# Patient Record
Sex: FEMALE | Race: WHITE | Employment: UNEMPLOYED | URBAN - METROPOLITAN AREA
[De-identification: names, ages, dates, MRNs, and addresses within clinical notes are randomized per-mention and may not be internally consistent; named-entity substitution may affect disease eponyms.]

---

## 2018-10-22 LAB
CHLAMYDIA, EXTERNAL: NEGATIVE
HBSAG, EXTERNAL: NEGATIVE
HIV, EXTERNAL: NEGATIVE
N. GONORRHEA, EXTERNAL: NEGATIVE
RPR, EXTERNAL: NORMAL
RUBELLA, EXTERNAL: NORMAL

## 2019-03-18 LAB — GRBS, EXTERNAL: NEGATIVE

## 2019-04-16 ENCOUNTER — HOSPITAL ENCOUNTER (INPATIENT)
Age: 34
LOS: 3 days | Discharge: HOME | End: 2019-04-19
Attending: OBSTETRICS & GYNECOLOGY | Admitting: OBSTETRICS & GYNECOLOGY
Payer: COMMERCIAL

## 2019-04-16 PROBLEM — O34.219 DESIRES VBAC (VAGINAL BIRTH AFTER CESAREAN) TRIAL: Status: ACTIVE | Noted: 2019-04-16

## 2019-04-16 PROBLEM — O09.529 ADVANCED MATERNAL AGE IN MULTIGRAVIDA: Status: ACTIVE | Noted: 2019-04-16

## 2019-04-16 LAB
ABO + RH BLD: NORMAL
BASOPHILS # BLD: 0 K/UL (ref 0–0.1)
BASOPHILS NFR BLD: 0 % (ref 0–2)
BLOOD GROUP ANTIBODIES SERPL: NORMAL
DIFFERENTIAL METHOD BLD: ABNORMAL
EOSINOPHIL # BLD: 0.1 K/UL (ref 0–0.4)
EOSINOPHIL NFR BLD: 1 % (ref 0–5)
ERYTHROCYTE [DISTWIDTH] IN BLOOD BY AUTOMATED COUNT: 13.2 % (ref 11.6–14.5)
HCT VFR BLD AUTO: 35 % (ref 35–45)
HGB BLD-MCNC: 11.7 G/DL (ref 12–16)
LYMPHOCYTES # BLD: 2.5 K/UL (ref 0.9–3.6)
LYMPHOCYTES NFR BLD: 28 % (ref 21–52)
MCH RBC QN AUTO: 32.7 PG (ref 24–34)
MCHC RBC AUTO-ENTMCNC: 33.4 G/DL (ref 31–37)
MCV RBC AUTO: 97.8 FL (ref 74–97)
MONOCYTES # BLD: 0.8 K/UL (ref 0.05–1.2)
MONOCYTES NFR BLD: 8 % (ref 3–10)
NEUTS SEG # BLD: 5.7 K/UL (ref 1.8–8)
NEUTS SEG NFR BLD: 63 % (ref 40–73)
PLATELET # BLD AUTO: 185 K/UL (ref 135–420)
PMV BLD AUTO: 10.5 FL (ref 9.2–11.8)
RBC # BLD AUTO: 3.58 M/UL (ref 4.2–5.3)
SPECIMEN EXP DATE BLD: NORMAL
WBC # BLD AUTO: 9.1 K/UL (ref 4.6–13.2)

## 2019-04-16 PROCEDURE — 74011250636 HC RX REV CODE- 250/636: Performed by: ADVANCED PRACTICE MIDWIFE

## 2019-04-16 PROCEDURE — 86900 BLOOD TYPING SEROLOGIC ABO: CPT

## 2019-04-16 PROCEDURE — 85025 COMPLETE CBC W/AUTO DIFF WBC: CPT

## 2019-04-16 PROCEDURE — 77030028565 HC CATH CERV RIPNG BLN COOK -B

## 2019-04-16 PROCEDURE — 65270000029 HC RM PRIVATE

## 2019-04-16 RX ORDER — MINERAL OIL
30 OIL (ML) ORAL AS NEEDED
Status: DISCONTINUED | OUTPATIENT
Start: 2019-04-16 | End: 2019-04-17 | Stop reason: HOSPADM

## 2019-04-16 RX ORDER — SODIUM CHLORIDE, SODIUM LACTATE, POTASSIUM CHLORIDE, CALCIUM CHLORIDE 600; 310; 30; 20 MG/100ML; MG/100ML; MG/100ML; MG/100ML
125 INJECTION, SOLUTION INTRAVENOUS CONTINUOUS
Status: DISCONTINUED | OUTPATIENT
Start: 2019-04-16 | End: 2019-04-17 | Stop reason: HOSPADM

## 2019-04-16 RX ORDER — METHYLERGONOVINE MALEATE 0.2 MG/ML
0.2 INJECTION INTRAVENOUS AS NEEDED
Status: DISCONTINUED | OUTPATIENT
Start: 2019-04-16 | End: 2019-04-17 | Stop reason: HOSPADM

## 2019-04-16 RX ORDER — ZOLPIDEM TARTRATE 5 MG/1
5 TABLET ORAL
Status: DISCONTINUED | OUTPATIENT
Start: 2019-04-16 | End: 2019-04-17 | Stop reason: SDUPTHER

## 2019-04-16 RX ORDER — NALBUPHINE HYDROCHLORIDE 10 MG/ML
10 INJECTION, SOLUTION INTRAMUSCULAR; INTRAVENOUS; SUBCUTANEOUS
Status: DISCONTINUED | OUTPATIENT
Start: 2019-04-16 | End: 2019-04-17 | Stop reason: HOSPADM

## 2019-04-16 RX ORDER — OXYTOCIN/RINGER'S LACTATE 20/1000 ML
999 PLASTIC BAG, INJECTION (ML) INTRAVENOUS ONCE
Status: COMPLETED | OUTPATIENT
Start: 2019-04-16 | End: 2019-04-17

## 2019-04-16 RX ORDER — HYDROMORPHONE HYDROCHLORIDE 1 MG/ML
1 INJECTION, SOLUTION INTRAMUSCULAR; INTRAVENOUS; SUBCUTANEOUS
Status: DISCONTINUED | OUTPATIENT
Start: 2019-04-16 | End: 2019-04-17 | Stop reason: HOSPADM

## 2019-04-16 RX ORDER — BUTORPHANOL TARTRATE 2 MG/ML
2 INJECTION INTRAMUSCULAR; INTRAVENOUS
Status: DISCONTINUED | OUTPATIENT
Start: 2019-04-16 | End: 2019-04-17 | Stop reason: HOSPADM

## 2019-04-16 RX ORDER — TERBUTALINE SULFATE 1 MG/ML
0.25 INJECTION SUBCUTANEOUS
Status: DISCONTINUED | OUTPATIENT
Start: 2019-04-16 | End: 2019-04-17 | Stop reason: HOSPADM

## 2019-04-16 RX ORDER — LIDOCAINE HYDROCHLORIDE 10 MG/ML
20 INJECTION, SOLUTION EPIDURAL; INFILTRATION; INTRACAUDAL; PERINEURAL AS NEEDED
Status: COMPLETED | OUTPATIENT
Start: 2019-04-16 | End: 2019-04-17

## 2019-04-16 RX ORDER — OXYTOCIN/RINGER'S LACTATE 20/1000 ML
125 PLASTIC BAG, INJECTION (ML) INTRAVENOUS CONTINUOUS
Status: DISCONTINUED | OUTPATIENT
Start: 2019-04-16 | End: 2019-04-17 | Stop reason: HOSPADM

## 2019-04-16 RX ADMIN — SODIUM CHLORIDE, SODIUM LACTATE, POTASSIUM CHLORIDE, AND CALCIUM CHLORIDE 125 ML/HR: 600; 310; 30; 20 INJECTION, SOLUTION INTRAVENOUS at 16:35

## 2019-04-16 RX ADMIN — SODIUM CHLORIDE, SODIUM LACTATE, POTASSIUM CHLORIDE, AND CALCIUM CHLORIDE 125 ML/HR: 600; 310; 30; 20 INJECTION, SOLUTION INTRAVENOUS at 20:38

## 2019-04-16 NOTE — PROGRESS NOTES
1620 Bedside and Verbal shift change report given to Shelley Arechiga RN (oncoming nurse) by Harriet Gannon RN (offgoing nurse). Report included the following information SBAR, Kardex and MAR.  
 
1750 Cook balloon placed by LIZY Sharpe CNM 2/50/-2 1919 Bedside and Verbal shift change report given to 1915 Claudine Johnson (oncoming nurse) by Shelley Arechiga RN (offgoing nurse). Report included the following information SBAR, Kardex, Procedure Summary, Intake/Output, MAR and Recent Results.

## 2019-04-16 NOTE — PROGRESS NOTES
1605 patient is a  at 40.2 weeks who presents to unit for scheduled post-dates induction (TOLAC).  Patient taken to LD room 6

## 2019-04-16 NOTE — PROGRESS NOTES
Labor Progress Note Patient seen, fetal heart rate and contraction pattern evaluated. Physical Exam: 
Pelvic: Cervix 2, Effaced: 50% Station:  -2 Intact Contractions: irregular, mild contractions Fetal Heart Rate: Reactive Assessment: 
Not in labor. PLAN: 
Reassuring fetal status, Continue plan for vaginal delivery. Suarez Matas placed with 80mL uterine and 80mL vaginal. To be removed at 0600. Plan for AROM during rounds.  
 
Brady Franco CNM 
4/16/2019 
5:58 PM

## 2019-04-17 PROCEDURE — 74011250637 HC RX REV CODE- 250/637: Performed by: ADVANCED PRACTICE MIDWIFE

## 2019-04-17 PROCEDURE — 65270000029 HC RM PRIVATE

## 2019-04-17 PROCEDURE — 75410000003 HC RECOV DEL/VAG/CSECN EA 0.5 HR

## 2019-04-17 PROCEDURE — 75410000000 HC DELIVERY VAGINAL/SINGLE

## 2019-04-17 PROCEDURE — 74011250636 HC RX REV CODE- 250/636: Performed by: ADVANCED PRACTICE MIDWIFE

## 2019-04-17 PROCEDURE — 10907ZC DRAINAGE OF AMNIOTIC FLUID, THERAPEUTIC FROM PRODUCTS OF CONCEPTION, VIA NATURAL OR ARTIFICIAL OPENING: ICD-10-PCS | Performed by: ADVANCED PRACTICE MIDWIFE

## 2019-04-17 PROCEDURE — 77030011943

## 2019-04-17 PROCEDURE — 0HQ9XZZ REPAIR PERINEUM SKIN, EXTERNAL APPROACH: ICD-10-PCS | Performed by: ADVANCED PRACTICE MIDWIFE

## 2019-04-17 PROCEDURE — 74011250636 HC RX REV CODE- 250/636

## 2019-04-17 PROCEDURE — 74011250637 HC RX REV CODE- 250/637: Performed by: OBSTETRICS & GYNECOLOGY

## 2019-04-17 PROCEDURE — 75410000002 HC LABOR FEE PER 1 HR

## 2019-04-17 RX ORDER — PROMETHAZINE HYDROCHLORIDE 25 MG/ML
25 INJECTION, SOLUTION INTRAMUSCULAR; INTRAVENOUS
Status: DISCONTINUED | OUTPATIENT
Start: 2019-04-17 | End: 2019-04-19 | Stop reason: HOSPADM

## 2019-04-17 RX ORDER — ONDANSETRON 2 MG/ML
4 INJECTION INTRAMUSCULAR; INTRAVENOUS ONCE
Status: COMPLETED | OUTPATIENT
Start: 2019-04-17 | End: 2019-04-17

## 2019-04-17 RX ORDER — OXYTOCIN/0.9 % SODIUM CHLORIDE 30/500 ML
PLASTIC BAG, INJECTION (ML) INTRAVENOUS
Status: COMPLETED
Start: 2019-04-17 | End: 2019-04-17

## 2019-04-17 RX ORDER — OXYTOCIN/0.9 % SODIUM CHLORIDE 30/500 ML
0-20 PLASTIC BAG, INJECTION (ML) INTRAVENOUS
Status: DISCONTINUED | OUTPATIENT
Start: 2019-04-17 | End: 2019-04-19 | Stop reason: HOSPADM

## 2019-04-17 RX ORDER — ACETAMINOPHEN 325 MG/1
650 TABLET ORAL
Status: DISCONTINUED | OUTPATIENT
Start: 2019-04-17 | End: 2019-04-19 | Stop reason: HOSPADM

## 2019-04-17 RX ORDER — MISOPROSTOL 100 UG/1
TABLET ORAL
Status: DISCONTINUED
Start: 2019-04-17 | End: 2019-04-17 | Stop reason: WASHOUT

## 2019-04-17 RX ORDER — AMOXICILLIN 250 MG
1 CAPSULE ORAL
Status: DISCONTINUED | OUTPATIENT
Start: 2019-04-17 | End: 2019-04-19 | Stop reason: HOSPADM

## 2019-04-17 RX ORDER — ZOLPIDEM TARTRATE 5 MG/1
5 TABLET ORAL
Status: DISCONTINUED | OUTPATIENT
Start: 2019-04-17 | End: 2019-04-19 | Stop reason: HOSPADM

## 2019-04-17 RX ORDER — IBUPROFEN 400 MG/1
800 TABLET ORAL EVERY 8 HOURS
Status: DISCONTINUED | OUTPATIENT
Start: 2019-04-17 | End: 2019-04-19 | Stop reason: HOSPADM

## 2019-04-17 RX ORDER — ONDANSETRON 2 MG/ML
INJECTION INTRAMUSCULAR; INTRAVENOUS
Status: COMPLETED
Start: 2019-04-17 | End: 2019-04-17

## 2019-04-17 RX ADMIN — ZOLPIDEM TARTRATE 5 MG: 5 TABLET ORAL at 00:08

## 2019-04-17 RX ADMIN — Medication 2 MILLI-UNITS/MIN: at 12:25

## 2019-04-17 RX ADMIN — IBUPROFEN 800 MG: 400 TABLET, FILM COATED ORAL at 23:29

## 2019-04-17 RX ADMIN — Medication 125 ML/HR: at 17:45

## 2019-04-17 RX ADMIN — ONDANSETRON 4 MG: 2 INJECTION INTRAMUSCULAR; INTRAVENOUS at 14:12

## 2019-04-17 RX ADMIN — ACETAMINOPHEN 650 MG: 325 TABLET ORAL at 20:03

## 2019-04-17 RX ADMIN — BUTORPHANOL TARTRATE 1 MG: 2 INJECTION, SOLUTION INTRAMUSCULAR; INTRAVENOUS at 14:26

## 2019-04-17 RX ADMIN — Medication 19980 MILLI-UNITS/HR: at 15:17

## 2019-04-17 RX ADMIN — LIDOCAINE HYDROCHLORIDE 10 ML: 10 INJECTION, SOLUTION EPIDURAL; INFILTRATION; INTRACAUDAL; PERINEURAL at 15:15

## 2019-04-17 RX ADMIN — IBUPROFEN 800 MG: 400 TABLET, FILM COATED ORAL at 16:21

## 2019-04-17 RX ADMIN — SODIUM CHLORIDE, SODIUM LACTATE, POTASSIUM CHLORIDE, AND CALCIUM CHLORIDE 125 ML/HR: 600; 310; 30; 20 INJECTION, SOLUTION INTRAVENOUS at 12:25

## 2019-04-17 NOTE — PROGRESS NOTES
Vaginal Delivery Procedure Note Name: Aminata Melendez Medical Record Number: [id number] YOB: 1985 Today's Date: 2019 Procedure: VAGINAL DELIVERY after  Anesthesia: yes Type - 1% xylocaine local:yes  Epidural: no 
Extra Procedure Details:   
  
Estimated Blood Loss: 150 ccs Fetal Description:  male Anterior shoulder: right Episiotomy: no  
Tear: yes, perineal abrasion, repaired Cord Blood Results:  
Information for the patient's :  Vanessa Najera [[id number]] No components found for: ABG Apgars: 9/9 Birth Information:  
Information for the patient's :  Vanessa Najera [[id number]] Placenta: delivered spontaneously at 1519, appears intact, 3 vessel cord Specimens: Placenta was not sent Complications:  none Birth Weight: pending infant skin to skin Mother's Condition: good Baby's Condition: good I was present for the delivery. Signed: Maria Guadalupe Abad CNM 2019

## 2019-04-17 NOTE — PROGRESS NOTES
Bedside and Verbal shift change report given to JA Graves RN (oncoming nurse) by Josr Ramos (offgoing nurse). Report included the following information SBAR, Kardex, Procedure Summary, Intake/Output, MAR and Recent Results. Pt just returned from shower. EFM re-applied. 0740 Reactive NST. Pt prefers labor without pitocin. EFM discontinued. Pt and  walking hallways. 1200 [facility] Jesus Socks at bedside. AROM with clear fluid. 0 Pt assisted to birthing ball besides the bed.  
 
1210 J. Jesus Socks on phone. Orders received to start Pitocin at 2 brandi-units going up by 2 every 30 minutes. 1225 Pitocin started. 1330 J. Jesus Socks at bedside SVE 8/75/-1  
 
1403 Pt states she feels pressure. SVE 8/75/0 per. Patsy Lewis CNM 
 
8/75/0. 
 
1426 Pt requests pain medication. Ok to give 1 mg of stadol per PARIS Guadalupe CNM 
 
1504 SVE complete 1506  pushing started. 0  of viable male infant [id number] Delivery of placenta. 1630 Clots noted. 1700 Pt ambulated to bathroom to void. 100 ml of urine voided. 1745 Straight cath. 100  Ml of urine drained. 16 Bank  with Patsy Lewis CNM informed her of increased bleeding down to small bleeding at this time. Second bag of pitocin hang at 125 ml /hr.  
 
1820 TRANSFER - OUT REPORT: 
 
Verbal report given to SAADIA Amaral Rn(name) on Mykel Morillo  being transferred to postpartum(unit) for routine progression of care Report consisted of patients Situation, Background, Assessment and  
Recommendations(SBAR). Information from the following report(s) SBAR, Kardex, Procedure Summary, Intake/Output, MAR and Recent Results was reviewed with the receiving nurse. Lines:  
Peripheral IV 19 Left;Posterior Hand (Active) Site Assessment Clean, dry, & intact 2019  7:12 AM  
Phlebitis Assessment 0 2019  7:12 AM  
Infiltration Assessment 0 2019  7:12 AM  
Dressing Status Clean, dry, & intact 2019  7:12 AM  
 Dressing Type Tape;Transparent 4/17/2019  7:12 AM  
Hub Color/Line Status Pink 4/17/2019  7:12 AM  
Alcohol Cap Used Yes 4/17/2019  7:12 AM  
  
 
Opportunity for questions and clarification was provided. Patient transported with: 
 Registered Nurse

## 2019-04-17 NOTE — PROGRESS NOTES
Labor Progress Note Patient seen, fetal heart rate and contraction pattern evaluated. Physical Exam: 
Pelvic: Cervix 8, Effaced: 75% Station:  0 Ruptured Contractions: Every 2-4 minutes, severe Fetal Heart Rate: Reactive Assessment: 
Active phase labor. and Satisfactory labor progress. PLAN: 
Reassuring fetal status, Continue plan for vaginal delivery Greta Barboza CNM 
4/17/2019 
2:10 PM

## 2019-04-17 NOTE — PROGRESS NOTES
Labor Progress Note Patient seen, fetal heart rate and contraction pattern evaluated. Physical Exam: 
Pelvic: Cervix 7, Effaced: 75% Station:  -2 Artificial Rupture of Membranes; Amniotic Fluid: copius amount of clear fluid Contractions: Every 6-8 minutes, moderate Fetal Heart Rate: Reactive Assessment: 
Satisfactory labor progress. PLAN: 
Reassuring fetal status, Continue plan for vaginal delivery.  
 
Pietro Tovar CNM 
4/17/2019 
9:39 AM

## 2019-04-17 NOTE — PROGRESS NOTES
Bedside and verbal report received from Maisha Gabriel RN via SBAR, Kardex, and STAR [facility] Assumed care of pt at this time. Pt resting in bed comfortably with  at bedside. POC reviewed with pt and . Both verbalized understanding. No needs expressed at this time. Will continue to monitor pt.  
 
1915: Head to toe assessment performed. 2300: Pt sleeping soundly in bed in NAD. Will continue to monitor pt.  
 
0008: 5mg Ambien given for sleep. 
 
0300: Pt sleeping soundly in bed in NAD. Will continue to monitor pt. 
 
0555: Fernanda Yayo balloon removed. SVE: 8/70/-3.  
 
0557: Strip reactive and reassuring. Pt taken off monitor. Pt up to shower at this time. 3322: PARIS Granda called unit. Informed CNM that pt shaw balloon has been removed. SVE: 8/70/-3. Ellie Null CNM stated she will come in sometime this morning and break her water. 0710: Bedside and verbal report given to JA Graves RN via SBAR, Kardex, and MAR. Care relinquished at this time.

## 2019-04-17 NOTE — LACTATION NOTE
Per mom, infant has been latching well and nursing frequently. Mom educated on breastfeeding basics--hunger cues, feeding on demand, waking baby if baby sleeps too long between feeds, importance of skin to skin, positioning and latching, risk of pacifier use and supplemental feedings, and importance of rooming in--and use of log sheet. Mom also educated on benefits of breastfeeding for herself and baby. Mom verbalized understanding. 200 infant latched, CC position. No questions at this time.

## 2019-04-18 LAB
HCT VFR BLD AUTO: 27.7 % (ref 35–45)
HGB BLD-MCNC: 9.2 G/DL (ref 12–16)

## 2019-04-18 PROCEDURE — 85018 HEMOGLOBIN: CPT

## 2019-04-18 PROCEDURE — 65270000029 HC RM PRIVATE

## 2019-04-18 PROCEDURE — 36415 COLL VENOUS BLD VENIPUNCTURE: CPT

## 2019-04-18 PROCEDURE — 74011250637 HC RX REV CODE- 250/637: Performed by: ADVANCED PRACTICE MIDWIFE

## 2019-04-18 PROCEDURE — 85014 HEMATOCRIT: CPT

## 2019-04-18 RX ADMIN — IBUPROFEN 800 MG: 400 TABLET, FILM COATED ORAL at 10:35

## 2019-04-18 RX ADMIN — IBUPROFEN 800 MG: 400 TABLET, FILM COATED ORAL at 18:32

## 2019-04-18 NOTE — PROGRESS NOTES
Bedside shift change report given to Lockie Epley, RN (oncoming nurse) by Sammi Gould RN (offgoing nurse). Report included the following information SBAR, MAR and Recent Results.

## 2019-04-18 NOTE — PROGRESS NOTES
Problem: Vaginal Delivery: Day of Deliver-Laboring Goal: Activity/Safety Outcome: Progressing Towards Goal 
Goal: Consults, if ordered Outcome: Progressing Towards Goal 
Goal: Diagnostic Test/Procedures Outcome: Progressing Towards Goal 
Goal: Nutrition/Diet Outcome: Progressing Towards Goal 
Goal: Discharge Planning Outcome: Progressing Towards Goal 
Goal: Medications Outcome: Progressing Towards Goal 
Goal: Respiratory Outcome: Progressing Towards Goal 
Goal: Treatments/Interventions/Procedures Outcome: Progressing Towards Goal 
Goal: *Vital signs within defined limits Outcome: Progressing Towards Goal 
Goal: *Labs within defined limits Outcome: Progressing Towards Goal 
Goal: *Hemodynamically stable Outcome: Progressing Towards Goal 
Goal: *Optimal pain control at patient's stated goal 
Outcome: Progressing Towards Goal 
  
Problem: Patient Education: Go to Patient Education Activity Goal: Patient/Family Education Outcome: Progressing Towards Goal

## 2019-04-18 NOTE — LACTATION NOTE
Infant latched and nursing well. Mom states, \"this is my fifth baby. \" Explained that [facility] is available if she needs assistance or has questions.

## 2019-04-18 NOTE — PROGRESS NOTES
Progress Note Patient: Kadie Hunter MRN: [id number]  SSN: [id number] YOB: 1985  Age: 26 y.o. Sex: female Subjective:  
 
Postpartum Day: 1 Vaginal Delivery The patient is without complaints. The patient is ambulating well. The patient  tolerating a normal diet. The baby is well. Objective:  
  
Patient Vitals for the past 8 hrs: 
 BP Temp Pulse Resp SpO2  
04/18/19 0726 98/58 97.7 °F (36.5 °C) 75 18 98 % LABS: Recent Results (from the past 24 hour(s)) HEMOGLOBIN Collection Time: 04/18/19  1:25 AM  
Result Value Ref Range HGB 9.2 (L) 12.0 - 16.0 g/dL HEMATOCRIT Collection Time: 04/18/19  1:25 AM  
Result Value Ref Range HCT 27.7 (L) 35.0 - 45.0 % Lab Results Component Value Date/Time HGB 9.2 (L) 04/18/2019 01:25 AM  
 
Lab Results Component Value Date/Time HCT 27.7 (L) 04/18/2019 01:25 AM  
  
Lochia:  appropriate Uterine Fundus:   firm, -1 Lab/Data Review: All lab results for the last 24 hours reviewed. Assessment:  
 
Status post: Doing well postpartum vaginal delivery day 1. Plan:  
 
Continue day 1 post-vaginal delivery orders. Postpartum care discussed including diet, ambulation, and actvitiy restrictions. Signed By: Brady Franco CNM April 18, 2019

## 2019-04-18 NOTE — LACTATION NOTE
Per mom, infant has been latching and nursing well. Gave additional lanolin per mom requesting. No questions or additional needs.

## 2019-04-19 VITALS
DIASTOLIC BLOOD PRESSURE: 70 MMHG | SYSTOLIC BLOOD PRESSURE: 124 MMHG | BODY MASS INDEX: 29.82 KG/M2 | RESPIRATION RATE: 18 BRPM | WEIGHT: 190 LBS | HEART RATE: 90 BPM | TEMPERATURE: 98.7 F | OXYGEN SATURATION: 100 % | HEIGHT: 67 IN

## 2019-04-19 PROCEDURE — 74011250637 HC RX REV CODE- 250/637: Performed by: ADVANCED PRACTICE MIDWIFE

## 2019-04-19 RX ORDER — IBUPROFEN 800 MG/1
800 TABLET ORAL EVERY 8 HOURS
Qty: 60 TAB | Refills: 1 | Status: SHIPPED | OUTPATIENT
Start: 2019-04-19

## 2019-04-19 RX ADMIN — ACETAMINOPHEN 650 MG: 325 TABLET ORAL at 05:44

## 2019-04-19 NOTE — PROGRESS NOTES
Chart reviewed for discharge teaching. Teaching completed. All questions and concerns were addressed and answered. No additional needs at this time. Time spent with patient reviewing discharge instructions to include the following information that was also provided in written form to the patient; normal vaginal bleeding to expect how to care for perineum and how to respond should she experience an increase in vaginal bleeding. Patient instructed that she should not lift more than the weight of her baby for at least a week (2 weeks if she delivered by  section). She was encouraged to stay hydrated and informed that she should not have sex, douche, use tampons,  or place anything in the vagina until her postpartum visit. Additionally she was instructed not to use tub baths, hot tubs or pools until cleared by her midwife or physician. Patient was informed that some swelling of her legs can be expected after delivery and to elevate them whenever possible. If the swelling increases or she has signs of calf pain/tenderness, or redness that is present in one leg more than the other she is to notify her provider or present in the emergency department for evaluation if unable to reach provider. Patients having delivered by  section were instructed not to drive for the first 2 weeks, to only go up and down stairs 1 time in a day for 2 weeks as this will increase her risk for the incision to open. She was also instructed not to scrub the incision but to allow soap and water to fall onto it and to pat dry. Patient was given signs and symptoms of infection and instructed to call provider with temperature equal to or > than 100.4, foul smelling blood or discharge from the vagina, and increase in redness or discharge from incisions or an open wound that is not healing.  
Patient was also instructed on the signs of postpartum depression and instructed that if she is considering harming herself or her infant, feels out of control, unable to care for herself or baby, feels sad or depressed most of the day every day, is having trouble sleeping or sleeping too much or is having trouble bonding with her baby she is to call her provider or present in the emergency department. Patient was also provided with signs and symptoms of a pulmonary embolism (PE). She was told what a PE is and instructed to call 911 if she experiences SOB (fast, shallow, rapid respirations) at rest, chest pain that worsens when coughing or change in her level of consciousness. Patient was informed that she might experience blood pressure changes during the postpartum weeks and that she should contact her provider with any severe, constant headaches that do not respond to over-the-counter pain medication, rest and/or hydration. She is also to call her provider with any changes in vision, seeing spots, or flashing lights, pain in the upper right quadrant of the abdomen, swelling of the face, hands, and/or legs more than what is expected or a change in her level of consciousness. Patient was strongly encouraged to keep her postpartum appointment and emphasized the importance of telling all providers her delivery date up until one year after the birth of her baby. Date of postpartum visit was confirmed prior to delivery. All questions were answered and patient voiced understanding of the information provided.

## 2019-04-19 NOTE — PROGRESS NOTES
Assumed care of pt.   
4296-called into room. Pt passed a large clot on floor in bathroom. Weighed in at 90 ml. Fundus firm with no flow with massaging. Small amount of blood on pad. 
0815-VSS. Assessment completed. Denies needs. 1105-discharge instructions signed. 1135-discharged home with baby via wheelchair.

## 2019-04-19 NOTE — DISCHARGE SUMMARY
Obstetrical Discharge Summary     Name: Helen Ennis MRN: [id number]  SSN: [id number]    YOB: 1985  Age: 26 y.o. Sex: female      Admit Date: 2019    Discharge Date: 2019     Admitting Physician: Catalina Gasca MD     Attending Physician:  Bekah Inman MD     Admission Diagnoses: Desires  (vaginal birth after ) trial [O34.219]  Advanced maternal age in multigravida [O09.529]    Discharge Diagnoses:   Information for the patient's :  Suleiman Nicholas [[id number]]   Delivery of a 4.04 kg male infant via Vaginal, Spontaneous on 2019 at 3:11 PM  by . Apgars were 9 and 9. Additional Diagnoses:   Hospital Problems  Date Reviewed: 2016          Codes Class Noted POA    Desires  (vaginal birth after ) trial ICD-10-CM: O34.219  ICD-9-CM: 654.20  2019 Unknown        Advanced maternal age in multigravida ICD-10-CM: O09.529  ICD-9-CM: 659.60  2019 Unknown             Lab Results   Component Value Date/Time    Rubella, External immune 10/22/2018    GrBStrep, External negative 2019       Immunization(s): There is no immunization history for the selected administration types on file for this patient. Hospital Course: Normal hospital course following the delivery. Patient Instructions:   Current Discharge Medication List      START taking these medications    Details   ibuprofen (MOTRIN) 800 mg tablet Take 1 Tab by mouth every eight (8) hours. Indications: Pain  Qty: 60 Tab, Refills: 1         CONTINUE these medications which have NOT CHANGED    Details   PNV#16-Iron Fum & PS-FA-OM-3 35-1-200 mg cap Take  by mouth. Indications: PREGNANCY         STOP taking these medications       oxyCODONE-acetaminophen (PERCOCET) 5-325 mg per tablet Comments:   Reason for Stopping:         IRON, FERROUS SULFATE, PO Comments:   Reason for Stopping:               Reference my discharge instructions.     Follow-up Appointments   Procedures    FOLLOW UP VISIT Appointment in: 6 Weeks     Standing Status:   Standing     Number of Occurrences:   1     Order Specific Question:   Appointment in     Answer:   6 Weeks        Signed By:  Hillary Donato CNM     April 19, 2019

## 2019-04-19 NOTE — PROGRESS NOTES
Bedside and Verbal shift change report given to AMRIK Montiel LPN  (oncoming nurse) by AMRIK Acharya RN (offgoing nurse).  Report included the following information SBAR, Kardex, Procedure Summary, Intake/Output, MAR, Accordion, Recent Results and Med Rec Status.

## 2019-04-19 NOTE — DISCHARGE INSTRUCTIONS
POST DELIVERY DISCHARGE INSTRUCTIONS    Name: Luiz Shankar  YOB: 1985  Primary Diagnosis: Active Problems:    Desires  (vaginal birth after ) trial (2019)      Advanced maternal age in multigravida (2019)        General:     Diet/Diet Restrictions:  Eight 8-ounce glasses of fluid daily (water, juices); avoid excessive caffeine intake. Meals/snacks as desired which are high in fiber and carbohydrates and low in fat and cholesterol. Physical Activity / Restrictions / Safety:     Avoid heavy lifting, no more that 8 lbs. For 2-3 weeks. Avoid intercourse 4-6 weeks, no douching or tampon use. Check with obstetrician before starting or resuming an exercise program.         Discharge Instructions/Special Treatment/Home Care Needs:     Continue prenatal vitamins. Continue to use squirt bottle with warm water on your episiotomy after each bathroom use until bleeding stops. Call your doctor for the following:     Fever over 100.4 degrees by mouth. Vaginal bleeding heavier than a normal menstrual period or clot larger than a golf ball. Red streaks or increased swelling of legs, painful red streaks on your breast.  Painful urination, constipation and increased pain or swelling or discharge with your incision. If you feel extremely anxious or overwhelmed. If you have thoughts of harming yourself and/or your baby. Pain Management:     Pain Management:   Take Acetaminophen (Tylenol) or Ibuprofen (Advil, Motrin), as directed for pain. Use a warm Sitz bath 3 times daily to relieve episiotomy or hemorrhoidal discomfort. Heating pad to  incision as needed. For hemorrhoidal discomfort, use Tucks and Anusol cream as needed and directed. Follow-Up Care:      These are general instructions for a healthy lifestyle:    No smoking/ No tobacco products/ Avoid exposure to second hand smoke    Surgeon General's Warning:  Quitting smoking now greatly reduces serious risk to your health. Obesity, smoking, and sedentary lifestyle greatly increases your risk for illness    A healthy diet, regular physical exercise & weight monitoring are important for maintaining a healthy lifestyle    Recognize signs and symptoms of STROKE:    F-face looks uneven    A-arms unable to move or move unevenly    S-speech slurred or non-existent    T-time-call 911 as soon as signs and symptoms begin-DO NOT go       Back to bed or wait to see if you get better-TIME IS BRAIN.     Patient armband removed and shredded

## 2019-04-19 NOTE — LACTATION NOTE
Breastfeeding discharge teaching completed to include feeding on demand, foremilk and hindmilk importance, engorgement, mastitis, clogged ducts, pumping, breastmilk storage, and returning to work. Information given about unit and office phone numbers and encouraged mom to reach out if concerns arise, but that [facility] would be calling her in the next few days to follow up on breastfeeding. Mom verbalized understanding and no questions at this time.

## 2021-04-08 NOTE — PROGRESS NOTES
Progress Note Patient: Paty Urias MRN: [id number]  SSN: [id number] YOB: 1985  Age: 26 y.o. Sex: female Subjective:  
 
Postpartum Day: 2 Vaginal Delivery The patient is without complaints. The patient is ambulating well. The patient  tolerating a normal diet. Flatus has been passed. The baby is well. Objective:  
  
Patient Vitals for the past 8 hrs: 
 BP Temp Pulse Resp SpO2  
19 0803 124/70 98.7 °F (37.1 °C) 90 18 100 % LABS: No results found for this or any previous visit (from the past 24 hour(s)). Lochia:  appropriate Uterine Fundus:   firm -1 Lab/Data Review: All lab results for the last 24 hours reviewed. Assessment:  
 
Status post: Doing well postpartum vaginal delivery day 2. Plan:  
 
Continue day 2 post-vaginal delivery orders, discharge today. Postpartum care discussed including diet, ambulation, and actvitiy restrictions. Patient is breastfeeding. Continue pre- vitamins. Medications as per MRF and discharge instructions. . Follow-up in six weeks. Signed By: Jeffery Moralez CNM 2019 There are no Wet Read(s) to document.

## 2023-04-26 NOTE — H&P
History & Physical 
 
Name: Kandis Terry MRN: [id number]  SSN: [id number] YOB: 1985  Age: 26 y.o. Sex: female Subjective:  
 
Estimated Date of Delivery: 19 OB History Jordis Both 5 Para 4 Term  
4  AB Living 3 SAB  
   
 TAB Ectopic Molar Multiple  
0 Live Births 3 Ms. Genny Feldman   is admitted with pregnancy at 40 weeks 2 days for induction of labor. Prenatal course was complicated by h/o a previous  with last pregnancy for breech presentation. Patient desires a TOLAC. Flores Long Please see prenatal records for details. PMHx, SHx, Family Hx, ROS unchanged from prenatal H&P. Group B Strep was negative. Objective:  
 
Vitals: There were no vitals filed for this visit. No data found. Physical Exam: 
  
unknown if currently breastfeeding. No data recorded. No intake/output data recorded. No intake/output data recorded. Pelvic: Cervix 2, Effaced: 50% Station:  -2 Data Review: No results found for this or any previous visit (from the past 24 hour(s)). Monitor:  Reactivity:present Variability:present Baseline:within normal limits Assessment/Plan:  
 
Plan:  Admit for induction of labor, Risks/Benefits explained and Consent Signed. Signed By:  Frandy Thorpe CNM 2019 No